# Patient Record
Sex: MALE
[De-identification: names, ages, dates, MRNs, and addresses within clinical notes are randomized per-mention and may not be internally consistent; named-entity substitution may affect disease eponyms.]

---

## 2023-07-30 ENCOUNTER — NURSE TRIAGE (OUTPATIENT)
Dept: OTHER | Facility: CLINIC | Age: 58
End: 2023-07-30

## 2023-07-30 NOTE — TELEPHONE ENCOUNTER
Location of patient: [address] call from [facility] with [facility]. Alayna Martin MRN: [id number]    Subjective: Caller states \"has been treated for covid, testing back negative. Was seen on Friday. Symptoms are better. Has questions. \"     Current Symptoms: Was exposed to someone with covid. Did a teledoc visit, given Paxlovid on Thursday. Friday night started with burning in throat, drainage, a little difficulty breathing, mild congestion. Saturday picked up Paxlovid, pharmacist gave a higher dose. Both test he has taken were negative, continue Paxlovid? Is getting hoarse, wondering if he should discontinue it. Feeling better today. Symptoms are improving. Associated Symptoms: NA    Pain Severity:     Temperature:      What has been tried: Paxlovid    Recommended disposition:  Call PCP within 24 hours    Care advice provided, patient verbalizes understanding; denies any other questions or concerns. Outcome:  Caller will follow up woth Pharmacist and then call PCP tomorrow morning.       This triage is a result of a call to the [facility]    Reason for Disposition   [1] Caller has NON-URGENT question AND [2] triager unable to answer    Protocols used: Coronavirus (COVID-19) Persisting Symptoms Follow-up Call-ADULT-